# Patient Record
Sex: FEMALE | Race: ASIAN | NOT HISPANIC OR LATINO | Employment: FULL TIME | URBAN - METROPOLITAN AREA
[De-identification: names, ages, dates, MRNs, and addresses within clinical notes are randomized per-mention and may not be internally consistent; named-entity substitution may affect disease eponyms.]

---

## 2024-06-26 ENCOUNTER — OFFICE VISIT (OUTPATIENT)
Dept: URGENT CARE | Facility: CLINIC | Age: 54
End: 2024-06-26
Payer: MEDICAID

## 2024-06-26 VITALS
SYSTOLIC BLOOD PRESSURE: 138 MMHG | WEIGHT: 139 LBS | DIASTOLIC BLOOD PRESSURE: 82 MMHG | OXYGEN SATURATION: 96 % | HEIGHT: 59 IN | RESPIRATION RATE: 14 BRPM | HEART RATE: 84 BPM | TEMPERATURE: 97.8 F | BODY MASS INDEX: 28.02 KG/M2

## 2024-06-26 DIAGNOSIS — L03.039 PARONYCHIA OF GREAT TOE: ICD-10-CM

## 2024-06-26 DIAGNOSIS — L60.0 INGROWN TOENAIL OF BOTH FEET: ICD-10-CM

## 2024-06-26 PROCEDURE — 99213 OFFICE O/P EST LOW 20 MIN: CPT | Performed by: STUDENT IN AN ORGANIZED HEALTH CARE EDUCATION/TRAINING PROGRAM

## 2024-06-26 PROCEDURE — 3075F SYST BP GE 130 - 139MM HG: CPT | Performed by: STUDENT IN AN ORGANIZED HEALTH CARE EDUCATION/TRAINING PROGRAM

## 2024-06-26 PROCEDURE — 3079F DIAST BP 80-89 MM HG: CPT | Performed by: STUDENT IN AN ORGANIZED HEALTH CARE EDUCATION/TRAINING PROGRAM

## 2024-06-26 RX ORDER — SULFAMETHOXAZOLE AND TRIMETHOPRIM 800; 160 MG/1; MG/1
1 TABLET ORAL 2 TIMES DAILY
Qty: 10 TABLET | Refills: 0 | Status: SHIPPED | OUTPATIENT
Start: 2024-06-26 | End: 2024-07-01

## 2024-06-26 NOTE — PROGRESS NOTES
Subjective:   CHIEF COMPLAINT  Chief Complaint   Patient presents with    Toe Pain     BL great toes, ingrown nails, pt is diabetic, x 2 weeks        HPI  Paula Bhatia is a 54 y.o. female who presents with a chief complaint of bilateral great toe pain x 2 weeks.  Concern for infection.  History of recurrent paronychia and ingrown toenails.  Reports she bumped her toes approximately 2 weeks ago on a chair and has had pain since that time.  Also developed some redness and swelling.  She is tried topical Polysporin which has not helped.  Experiencing localized tenderness to palpation.  No fevers, chills or systemic symptoms.  Patient is diabetic with some neuropathy but does reports she has sensation in her feet.    REVIEW OF SYSTEMS  General: no fever or chills  GI: no nausea or vomiting  See HPI for further details.    PAST MEDICAL HISTORY  Patient Active Problem List    Diagnosis Date Noted    Eructation 11/09/2016    Bilateral hand numbness 02/22/2016    Arthritis     Back pain     Essential hypertension 07/01/2015    Diabetes mellitus type II, uncontrolled 07/01/2015    Snoring 10/09/2014    Heart murmur 10/09/2014    Vitamin D deficiency disease     Preventative health care 01/24/2014    Hyperlipidemia with target LDL less than 100        SURGICAL HISTORY   has a past surgical history that includes colonoscopy - endo (10/31/2009); hysterectomy laparoscopy (Bilateral, 1/27/2017); cystoscopy (1/27/2017); and vaginal suspension (1/27/2017).    ALLERGIES  No Known Allergies    CURRENT MEDICATIONS  Home Medications       Reviewed by Jose Mcbride Ass't (Medical Assistant) on 06/26/24 at 1409  Med List Status: <None>     Medication Last Dose Status   CRESTOR 20 MG Tab Taking Active   fenofibrate (TRICOR) 145 MG Tab Taking Active   ferrous sulfate 325 (65 FE) MG tablet Taking Active   hydrochlorothiazide (MICROZIDE) 12.5 MG capsule Taking Active   ibuprofen (MOTRIN) 800 MG Tab Taking Active   insulin  "glargine (LANTUS SOLOSTAR) 100 UNIT/ML Solution Pen-injector injection Taking Active   insulin lispro, Human, (HUMALOG KWIKPEN) 100 UNIT/ML Solution Pen-injector injection Not Taking Active   lactobacillus granules (LACTINEX/FLORANEX) PACK Taking Active   lisinopril (PRINIVIL, ZESTRIL) 30 MG tablet Taking Active   Multiple Vitamin (MULTI-VITAMIN PO) Taking Active   Omega-3 Fatty Acids (FISH OIL PO) Taking Active   oxycodone-acetaminophen (PERCOCET) 5-325 MG Tab Taking Active   trazodone (DESYREL) 50 MG Tab Taking Active   vitamin D (CHOLECALCIFEROL) 1000 UNIT Tab Taking Active                    SOCIAL HISTORY  Social History     Tobacco Use    Smoking status: Never    Smokeless tobacco: Never   Vaping Use    Vaping status: Never Used   Substance and Sexual Activity    Alcohol use: No     Alcohol/week: 0.0 oz    Drug use: No    Sexual activity: Not on file       FAMILY HISTORY  Family History   Problem Relation Age of Onset    Heart Disease Mother     Diabetes Mother     Hypertension Mother     Hyperlipidemia Mother     Diabetes Maternal Grandmother     Heart Disease Maternal Grandmother     Diabetes Maternal Grandfather     Heart Disease Maternal Grandfather     Cancer Sister           Objective:   PHYSICAL EXAM  VITAL SIGNS: /82   Pulse 84   Temp 36.6 °C (97.8 °F)   Resp 14   Ht 1.499 m (4' 11\")   Wt 63 kg (139 lb)   SpO2 96%   BMI 28.07 kg/m²     Gen: no acute distress, normal voice  Skin: dry, intact, moist mucosal membranes  Head: Atraumatic, normocephalic  Psych: normal affect, normal judgement, alert, awake  Musculoskeletal: Right great toe: Erythema and edema along the perionychium and eponychium without any fluctuance or indurated lesions.  Localized tenderness to palpation along the periphery of the nailbed/nail plate.    Left great toe: Ingrown medially with dried blood.  Mild erythema and edema along the medial margins of the perionychium and update Kiara and without any fluctuance or " indurated lesions.  Localized tenderness to palpation along the periphery of the nail bed and nail plate.      Assessment/Plan:     1. Paronychia of great toe  sulfamethoxazole-trimethoprim (BACTRIM DS) 800-160 MG tablet      2. Ingrown toenail of both feet        No drainable abscess.  -Ordered Bactrim  -Recommend she follow-up with her PCP for possible nail plate removal for definitive management given the recurrence  -Return to urgent care any new/worsening symptoms or further questions or concerns.  Patient understood everything discussed.  All questions were answered.      Differential diagnosis and supportive care discussed. Follow-up as needed if symptoms worsen or fail to improve to PCP, Urgent care or Emergency Room.    Please note that this dictation was created using voice recognition software. I have made a reasonable attempt to correct obvious errors, but I expect that there are errors of grammar and possibly content that I did not discover before finalizing the note.

## 2025-05-24 ENCOUNTER — OFFICE VISIT (OUTPATIENT)
Dept: URGENT CARE | Facility: CLINIC | Age: 55
End: 2025-05-24
Payer: MEDICAID

## 2025-05-24 VITALS
TEMPERATURE: 97.4 F | HEART RATE: 94 BPM | RESPIRATION RATE: 16 BRPM | DIASTOLIC BLOOD PRESSURE: 74 MMHG | BODY MASS INDEX: 27.48 KG/M2 | OXYGEN SATURATION: 98 % | SYSTOLIC BLOOD PRESSURE: 136 MMHG | WEIGHT: 140 LBS | HEIGHT: 60 IN

## 2025-05-24 DIAGNOSIS — R42 DIZZINESS: Primary | ICD-10-CM

## 2025-05-24 DIAGNOSIS — E11.65 UNCONTROLLED TYPE 2 DIABETES MELLITUS WITH HYPERGLYCEMIA (HCC): ICD-10-CM

## 2025-05-24 LAB — GLUCOSE BLD-MCNC: 336 MG/DL (ref 65–99)

## 2025-05-24 NOTE — LETTER
May 24, 2025    To Whom It May Concern:         This is confirmation that Paula Puente Jorge Luison attended her scheduled appointment with Colin Elder P.A.-C. on 5/24/25.  Excused from work on 5/22/2025 through 5/24/2025.         If you have any questions please do not hesitate to call me at the phone number listed below.    Sincerely,          Colin Elder P.A.-C.  [phone]

## 2025-05-24 NOTE — PROGRESS NOTES
Subjective:   Paula Bhatia is a 54 y.o. female who presents for Other (Sciatic Nerve pain/ pt states lose of balance/ fall / pt requesting a work note / dizzy/ high blood sugar, A1c : 14)      HPI:  54-year-old female presents to the urgent care for elevated blood glucose levels between 300-500.  She does have a history of type 2 diabetes which is not been well-controlled.  Her last A1c of 14.  Has been having intermittent dizziness.  States that she is having increasing numbness to her lower legs which caused her to fall a couple days ago as she could not feel the step under her causing her to trip.  States that she was also dizzy at that time.  She is on Lantus and did increase the units without improvement.  She is also taking Jardiance.  She did have some nausea last week but no nausea or vomiting today.  No chest pain or shortness of breath.    Medications:    Crestor Tabs  fenofibrate Tabs  ferrous sulfate  FISH OIL PO  hydrochlorothiazide  ibuprofen Tabs  insulin glargine Sopn  insulin lispro (Human)  lactobacillus granules Pack  lisinopril  MULTI-VITAMIN PO  oxyCODONE-acetaminophen Tabs  traZODone Tabs  vitamin D Tabs    Allergies: Patient has no known allergies.    Problem List: Paula Bhatia does not have any pertinent problems on file.    Surgical History:  Past Surgical History:   Procedure Laterality Date    HYSTERECTOMY LAPAROSCOPY Bilateral 1/27/2017    Procedure: HYSTERECTOMY LAPAROSCOPY TOTAL W/BILATERAL SALPINGO OOPHERECTOMY;  Surgeon: Ute Chairez M.D.;  Location: SURGERY SAME DAY [facility] ORS;  Service:     CYSTOSCOPY  1/27/2017    Procedure: CYSTOSCOPY ;  Surgeon: Ute Chairez M.D.;  Location: SURGERY SAME DAY [facility] ORS;  Service:     VAGINAL SUSPENSION  1/27/2017    Procedure: VAGINAL SUSPENSION - VAULT AND BANKS'S CULDOPLASTY;  Surgeon: Ute Chairez M.D.;  Location: SURGERY SAME DAY [facility] ORS;  Service:     COLONOSCOPY - ENDO  10/31/2009    Performed by  SONDRA ATWOOD at ENDOSCOPY [facility] ORS       Past Social Hx: Paula Bhatia  reports that she has never smoked. She has never used smokeless tobacco. She reports that she does not drink alcohol and does not use drugs.     Past Family Hx:  Paula Bhatia family history includes Cancer in her sister; Diabetes in her maternal grandfather, maternal grandmother, and mother; Heart Disease in her maternal grandfather, maternal grandmother, and mother; Hyperlipidemia in her mother; Hypertension in her mother.     Problem list, medications, and allergies reviewed by myself today in Epic.     Objective:     /74   Pulse 94   Temp 36.3 °C (97.4 °F) (Temporal)   Resp 16   Ht 1.524 m (5')   Wt 63.5 kg (140 lb)   SpO2 98%   BMI 27.34 kg/m²     Physical Exam  Vitals reviewed.   Constitutional:       Appearance: She is not toxic-appearing.   HENT:      Mouth/Throat:      Mouth: Mucous membranes are moist.   Cardiovascular:      Rate and Rhythm: Normal rate and regular rhythm.      Pulses: Normal pulses.      Heart sounds: Normal heart sounds. No murmur heard.  Pulmonary:      Effort: Pulmonary effort is normal. No respiratory distress.      Breath sounds: Normal breath sounds. No stridor. No wheezing, rhonchi or rales.   Neurological:      Mental Status: She is alert and oriented to person, place, and time.       Lab Results/POC Test Results   Results for orders placed or performed in visit on 05/24/25   POCT Glucose    Collection Time: 05/24/25  2:50 PM   Result Value Ref Range    Glucose - Accu-Ck 336 (A) 65 - 99 mg/dL             Assessment/Plan:     Diagnosis and associated orders:     1. Dizziness  POCT Glucose      2. Uncontrolled type 2 diabetes mellitus with hyperglycemia (HCC)           Comments/MDM:     POCT glucose in clinic of 336.  Patient states that her glucose readings at home have been between 300-500 and she did increase her Lantus without any improvement.  She has been having intermittent  dizziness and increasing numbness to her lower legs.  She did have a fall 2 days ago due to mixture of the dizziness and peripheral neuropathy.  Not having any chest pain, shortness of breath, or vomiting.  Discussed with patient that I do not have the ability in urgent care to further manage what appears to be uncontrolled diabetes and did recommend that her to present to the emergency department.  She is agreeable to this.  She does not want EMS transport.         Differential diagnosis, natural history, supportive care, and indications for immediate follow-up discussed.    Advised the patient to follow-up with the primary care physician for recheck, reevaluation, and consideration of further management.    Please note that this dictation was created using voice recognition software. I have made a reasonable attempt to correct obvious errors, but I expect that there are errors of grammar and possibly content that I did not discover before finalizing the note.    Electronically signed by Colin Elder PA-C.